# Patient Record
Sex: FEMALE | Race: WHITE | NOT HISPANIC OR LATINO | Employment: FULL TIME | URBAN - METROPOLITAN AREA
[De-identification: names, ages, dates, MRNs, and addresses within clinical notes are randomized per-mention and may not be internally consistent; named-entity substitution may affect disease eponyms.]

---

## 2023-11-03 PROBLEM — M79.672 LEFT FOOT PAIN: Status: ACTIVE | Noted: 2023-11-03

## 2023-11-03 NOTE — PROGRESS NOTES
"Office visit 11/02/2023  (S) \" I've got a hammertoe and Dr. Rodriguez sent me your way. I can pick that thing out and it just keeps coming back. I want the thing fixed.\"  (O) Patient with:   Contracted deformed left fifth toe with thick hyperkeratotic lesion at tibial nail border with palpable exostosis at distal phalanx left fifth. Curled under left fifth toe with weight bearing.  (IMP) Hammertoe left fifth, with hyperkeratotic lesion dorsomedially. Dislocated DIPJ left fifth with adductovarus fifth toe.  (PLAN)    Exam, H&P   Evaluated, managed foot problem   Debrided HD-5 left   X-rays x3 left foot   Advise of diagnosis and treatment options   Advise flexor set with exostectomy distal phalanx, left fifth   Will schedule surgery for 12/08/2023  "

## 2023-11-09 ENCOUNTER — PREP FOR PROCEDURE (OUTPATIENT)
Dept: PODIATRY | Facility: HOSPITAL | Age: 43
End: 2023-11-09
Payer: COMMERCIAL

## 2023-11-09 DIAGNOSIS — M20.42 ACQUIRED HAMMER TOE DEFORMITY OF LESSER TOE OF LEFT FOOT: Primary | ICD-10-CM

## 2023-11-09 NOTE — H&P
3eHistory Of Present Illness  Azalea Monae is a 43 y.o. female presenting with painful left fifth toe, curled under fourth toe with painful corn.     Past Medical History  She has no past medical history on file.    Surgical History  She has no past surgical history on file.     Social History  She has no history on file for tobacco use, alcohol use, and drug use.    Family History  No family history on file.     Allergies  Patient has no allergy information on record.    Review of Systems    REVIEW OF SYSTEMS  GENERAL:  Negative for malaise, significant weight loss, fever  HEENT:  No changes in hearing or vision, no nose bleeds or other nasal problems and Negative for frequent or significant headaches  NECK:  Negative for lumps, goiter, pain and significant neck swelling  RESPIRATORY:  Negative for cough, wheezing and shortness of breath  CARDIOVASCULAR:  Negative for chest pain, leg swelling and palpitations  GI:  Negative for abdominal discomfort, blood in stools or black stools and change in bowel habits  :  Negative for dysuria, frequency and incontinence  MUSCULOSKELETAL:  Negative for joint pain or swelling, back pain, and muscle pain.  SKIN:  Negative for lesions, rash, and itching  PSYCH:  Negative for sleep disturbance, mood disorder and recent psychosocial stressors  HEMATOLOGY/LYMPHOLOGY:  Negative for prolonged bleeding, bruising easily, and swollen nodes.  ENDOCRINE:  Negative for cold or heat intolerance, polyuria, polydipsia and goiter  NEURO: Negative, denies any burning, tingling or numbness     Objective:   Vasc: DP and PT pulses are palpable bilateral.  CFT is less than 3 seconds bilateral.  Skin temperature is warm to warm proximal to distal bilateral.      Neuro:  Light touch is intact to the foot bilateral.  Protective sensation is intact to the foot when tested with the 5.07 SWM bilateral.  There is no clonus noted.      Derm: Nails 1-5 bilateral are intact.  Skin is supple with normal  texture and turgor noted.  Webspaces are clean, dry and intact bilateral.  There is a painful hyperkeratotic lesion noted at the dorsal tibial nail groove of the left fifth toe.    Ortho: Muscle strength is 5/5 for all pedal groups tested.  Ankle joint, subtalar joint, 1st MPJ and lesser MPJ ROM is full and without pain or crepitus.  The foot type is rectus bilateral off weight bearing.  The structural deformities of a curled under left fifth toe with palpable exostosis at the tip of the distal phalanx of the left fifth toe is noted.        Physical Exam     Last Recorded Vitals  There were no vitals taken for this visit.    Relevant Results        Radiographs taken in office 11/02/2023 demonstrate the contracted toe with distal phalanx exostosis, left fifth toe.     Assessment/Plan       Hammertoe, left fifth  Surgical plan:  Hammertoe correction with exostectomy and flexor tendon release, left fifth on 12/08/2023       I spent 45 minutes in the professional and overall care of this patient on 11/02/2023      José Kirkland DPM

## 2023-11-29 ENCOUNTER — TELEPHONE (OUTPATIENT)
Dept: PREADMISSION TESTING | Facility: HOSPITAL | Age: 43
End: 2023-11-29
Payer: COMMERCIAL

## 2023-12-01 ENCOUNTER — PRE-ADMISSION TESTING (OUTPATIENT)
Dept: PREADMISSION TESTING | Facility: HOSPITAL | Age: 43
End: 2023-12-01
Payer: COMMERCIAL

## 2023-12-01 VITALS
RESPIRATION RATE: 16 BRPM | TEMPERATURE: 96.3 F | HEART RATE: 74 BPM | WEIGHT: 127.87 LBS | SYSTOLIC BLOOD PRESSURE: 104 MMHG | BODY MASS INDEX: 21.3 KG/M2 | HEIGHT: 65 IN | OXYGEN SATURATION: 98 % | DIASTOLIC BLOOD PRESSURE: 59 MMHG

## 2023-12-01 PROCEDURE — 99204 OFFICE O/P NEW MOD 45 MIN: CPT | Performed by: PHYSICIAN ASSISTANT

## 2023-12-01 RX ORDER — MELATONIN 10 MG
10 CAPSULE ORAL NIGHTLY
COMMUNITY
Start: 2021-07-28

## 2023-12-01 RX ORDER — GLUCOSAM/CHONDRO/HERB 149/HYAL 750-100 MG
2 TABLET ORAL NIGHTLY
COMMUNITY

## 2023-12-01 RX ORDER — DEXTROAMPHETAMINE SACCHARATE, AMPHETAMINE ASPARTATE MONOHYDRATE, DEXTROAMPHETAMINE SULFATE AND AMPHETAMINE SULFATE 5; 5; 5; 5 MG/1; MG/1; MG/1; MG/1
20 CAPSULE, EXTENDED RELEASE ORAL EVERY MORNING
COMMUNITY

## 2023-12-01 RX ORDER — SOLRIAMFETOL 150 MG/1
1 TABLET, FILM COATED ORAL DAILY
COMMUNITY

## 2023-12-01 RX ORDER — BISMUTH SUBSALICYLATE 262 MG
1 TABLET,CHEWABLE ORAL DAILY
COMMUNITY

## 2023-12-01 RX ORDER — CETIRIZINE HYDROCHLORIDE 10 MG/1
10 TABLET ORAL
COMMUNITY

## 2023-12-01 RX ORDER — OMEPRAZOLE 20 MG/1
20 CAPSULE, DELAYED RELEASE ORAL DAILY
COMMUNITY
Start: 2023-08-14

## 2023-12-01 ASSESSMENT — ENCOUNTER SYMPTOMS
ENDOCRINE NEGATIVE: 1
HEMATOLOGIC/LYMPHATIC NEGATIVE: 1
PSYCHIATRIC NEGATIVE: 1
GASTROINTESTINAL NEGATIVE: 1
ALLERGIC/IMMUNOLOGIC NEGATIVE: 1
CARDIOVASCULAR NEGATIVE: 1
CONSTITUTIONAL NEGATIVE: 1
RESPIRATORY NEGATIVE: 1
EYES NEGATIVE: 1
NEUROLOGICAL NEGATIVE: 1

## 2023-12-01 NOTE — H&P (VIEW-ONLY)
[facility] Evaluation       Name: Azalea Monae (Azalea Monae)  /Age: 1980/43 y.o.     In-Person       Chief Complaint: Acquired hammer toe deformity of lesser toe of left foot     HPI    Date of Consult: 23    Referring Provider: Dr. Kirkland    Surgery, Date, and Length: Digit Foot Joint Repair - Left ; 23; 60 minutes    Azalea Monae  is a 43 year-old female who presents to the [facility] for perioperative risk assessment prior to surgery.  States history unknown remote fracture.  Since then deformity of toe that causes friction and callous development.    This note was created in part upon personal review of patient's medical records.      Patient is scheduled to have Digit Foot Joint Repair - Left     Medical History  Past Medical History:   Diagnosis Date    Allergic rhinitis     Chronic fatigue     GERD (gastroesophageal reflux disease)     Hepatitis A     questionable diagnosis >20 years    POTS (postural orthostatic tachycardia syndrome)     + tilt table  - pt asymptomatic    Vision loss     glasses    Vocal cord paralysis     left        STOP BANG = 0    Caprini = 2    Surgical History  Past Surgical History:   Procedure Laterality Date    CERVICAL BIOPSY  W/ LOOP ELECTRODE EXCISION       SECTION, LOW TRANSVERSE      OTHER SURGICAL HISTORY      vocal cord surgery    WISDOM TOOTH EXTRACTION            The patient is not a [employer] and will accept blood and blood products if medically indicated.        Family history:  Family History   Problem Relation Name Age of Onset    Seizures Mother      Diabetes Father      Kidney disease Father          Social history:  Social History     Socioeconomic History    Marital status:      Spouse name: Not on file    Number of children: Not on file    Years of education: Not on file    Highest education level: Not on file   Occupational History    Not on file   Tobacco Use    Smoking status: Former     Packs/day: 0.50      "Years: 16.00     Additional pack years: 0.00     Total pack years: 8.00     Types: Cigarettes     Quit date: 2016     Years since quittin.9    Smokeless tobacco: Not on file   Substance and Sexual Activity    Alcohol use: Yes     Comment: rare social    Drug use: Never    Sexual activity: Not on file   Other Topics Concern    Not on file   Social History Narrative    Not on file     Social Determinants of Health     Financial Resource Strain: Not on file   Food Insecurity: Not on file   Transportation Needs: Not on file   Physical Activity: Not on file   Stress: Not on file   Social Connections: Not on file   Intimate Partner Violence: Not on file   Housing Stability: Not on file          Current Outpatient Medications:     melatonin 10 mg capsule, Take 1 capsule (10 mg) by mouth once daily at bedtime., Disp: , Rfl:     omeprazole (PriLOSEC) 20 mg DR capsule, Take 1 capsule (20 mg) by mouth once daily., Disp: , Rfl:     amphetamine-dextroamphetamine XR (Adderall XR) 20 mg 24 hr capsule, Take 1 capsule (20 mg) by mouth once daily in the morning., Disp: , Rfl:     cetirizine (ZyrTEC) 10 mg tablet, Take 1 tablet (10 mg) by mouth once daily., Disp: , Rfl:     multivitamin tablet, Take 1 tablet by mouth once daily., Disp: , Rfl:     NON FORMULARY, Take 1 each by mouth once daily. AZO, Disp: , Rfl:     omega 3-dha-epa-fish oil (Fish OiL) 1,000 mg (120 mg-180 mg) capsule, Take 2 capsules (2,000 mg) by mouth once daily at bedtime., Disp: , Rfl:     Sunosi 150 mg tablet, Take 1 tablet by mouth once daily., Disp: , Rfl:          Visit Vitals  /59   Pulse 74   Temp 35.7 °C (96.3 °F)   Resp 16   Ht 1.651 m (5' 5\")   Wt 58 kg (127 lb 13.9 oz)   SpO2 98%   BMI 21.28 kg/m²   Smoking Status Former   BSA 1.63 m²        Review of Systems   Constitutional: Negative.    HENT: Negative.     Eyes: Negative.    Respiratory: Negative.     Cardiovascular: Negative.    Gastrointestinal: Negative.    Endocrine: Negative.  "   Genitourinary: Negative.    Musculoskeletal:         Left toe pain   Skin: Negative.    Allergic/Immunologic: Negative.    Neurological: Negative.    Hematological: Negative.    Psychiatric/Behavioral: Negative.          Physical Exam  Vitals reviewed.   Constitutional:       Appearance: Normal appearance.   HENT:      Head: Normocephalic and atraumatic.      Right Ear: External ear normal.      Left Ear: External ear normal.      Nose: Nose normal.      Mouth/Throat:      Pharynx: Oropharynx is clear.   Eyes:      Extraocular Movements: Extraocular movements intact.      Conjunctiva/sclera: Conjunctivae normal.      Pupils: Pupils are equal, round, and reactive to light.   Cardiovascular:      Rate and Rhythm: Normal rate and regular rhythm.      Heart sounds: Normal heart sounds.   Pulmonary:      Effort: Pulmonary effort is normal.      Breath sounds: Normal breath sounds.   Abdominal:      Palpations: Abdomen is soft.   Musculoskeletal:         General: Normal range of motion.      Cervical back: Normal range of motion and neck supple.   Skin:     General: Skin is warm and dry.   Neurological:      General: No focal deficit present.      Mental Status: She is alert and oriented to person, place, and time.   Psychiatric:         Mood and Affect: Mood normal.         Behavior: Behavior normal.          PAT AIRWAY:   Airway:     Mallampati::  I    Neck ROM::  Full      RCRI  0  , 3.9 % Risk of MACE    Hematology       Patient instructed to ambulate as soon as possible postoperatively to decrease thromboembolic risk.      Initiate mechanical DVT prophylaxis as soon as possible and initiate chemical prophylaxis when deemed safe from a bleeding standpoint post surgery.       VTE prophylaxis per surgical team     Tests ordered in PAT: none    Risk assessment complete.  Patient is scheduled for a low surgical risk procedure.        Preoperative medication instructions were provided and reviewed with the patient.  Any  additional testing or evaluation was explained to the patient.  Nothing by mouth instructions were discussed and patient's questions were answered prior to conclusion to this encounter.  Patient verbalized understanding of preoperative instructions given in preadmission testing; discharge instructions available in EMR.    This note was dictated by a speech recognition.  Minor errors may have been detected in a speech recognition.

## 2023-12-01 NOTE — CPM/PAT H&P
[facility] Evaluation       Name: Azalea Monae (Azalea Monae)  /Age: 1980/43 y.o.     In-Person       Chief Complaint: Acquired hammer toe deformity of lesser toe of left foot     HPI    Date of Consult: 23    Referring Provider: Dr. Kirkland    Surgery, Date, and Length: Digit Foot Joint Repair - Left ; 23; 60 minutes    Azalea Monae  is a 43 year-old female who presents to the [facility] for perioperative risk assessment prior to surgery.  States history unknown remote fracture.  Since then deformity of toe that causes friction and callous development.    This note was created in part upon personal review of patient's medical records.      Patient is scheduled to have Digit Foot Joint Repair - Left     Medical History  Past Medical History:   Diagnosis Date    Allergic rhinitis     Chronic fatigue     GERD (gastroesophageal reflux disease)     Hepatitis A     questionable diagnosis >20 years    POTS (postural orthostatic tachycardia syndrome)     + tilt table  - pt asymptomatic    Vision loss     glasses    Vocal cord paralysis     left        STOP BANG = 0    Caprini = 2    Surgical History  Past Surgical History:   Procedure Laterality Date    CERVICAL BIOPSY  W/ LOOP ELECTRODE EXCISION       SECTION, LOW TRANSVERSE      OTHER SURGICAL HISTORY      vocal cord surgery    WISDOM TOOTH EXTRACTION            The patient is not a [employer] and will accept blood and blood products if medically indicated.        Family history:  Family History   Problem Relation Name Age of Onset    Seizures Mother      Diabetes Father      Kidney disease Father          Social history:  Social History     Socioeconomic History    Marital status:      Spouse name: Not on file    Number of children: Not on file    Years of education: Not on file    Highest education level: Not on file   Occupational History    Not on file   Tobacco Use    Smoking status: Former     Packs/day: 0.50      "Years: 16.00     Additional pack years: 0.00     Total pack years: 8.00     Types: Cigarettes     Quit date: 2016     Years since quittin.9    Smokeless tobacco: Not on file   Substance and Sexual Activity    Alcohol use: Yes     Comment: rare social    Drug use: Never    Sexual activity: Not on file   Other Topics Concern    Not on file   Social History Narrative    Not on file     Social Determinants of Health     Financial Resource Strain: Not on file   Food Insecurity: Not on file   Transportation Needs: Not on file   Physical Activity: Not on file   Stress: Not on file   Social Connections: Not on file   Intimate Partner Violence: Not on file   Housing Stability: Not on file          Current Outpatient Medications:     melatonin 10 mg capsule, Take 1 capsule (10 mg) by mouth once daily at bedtime., Disp: , Rfl:     omeprazole (PriLOSEC) 20 mg DR capsule, Take 1 capsule (20 mg) by mouth once daily., Disp: , Rfl:     amphetamine-dextroamphetamine XR (Adderall XR) 20 mg 24 hr capsule, Take 1 capsule (20 mg) by mouth once daily in the morning., Disp: , Rfl:     cetirizine (ZyrTEC) 10 mg tablet, Take 1 tablet (10 mg) by mouth once daily., Disp: , Rfl:     multivitamin tablet, Take 1 tablet by mouth once daily., Disp: , Rfl:     NON FORMULARY, Take 1 each by mouth once daily. AZO, Disp: , Rfl:     omega 3-dha-epa-fish oil (Fish OiL) 1,000 mg (120 mg-180 mg) capsule, Take 2 capsules (2,000 mg) by mouth once daily at bedtime., Disp: , Rfl:     Sunosi 150 mg tablet, Take 1 tablet by mouth once daily., Disp: , Rfl:          Visit Vitals  /59   Pulse 74   Temp 35.7 °C (96.3 °F)   Resp 16   Ht 1.651 m (5' 5\")   Wt 58 kg (127 lb 13.9 oz)   SpO2 98%   BMI 21.28 kg/m²   Smoking Status Former   BSA 1.63 m²        Review of Systems   Constitutional: Negative.    HENT: Negative.     Eyes: Negative.    Respiratory: Negative.     Cardiovascular: Negative.    Gastrointestinal: Negative.    Endocrine: Negative.  "   Genitourinary: Negative.    Musculoskeletal:         Left toe pain   Skin: Negative.    Allergic/Immunologic: Negative.    Neurological: Negative.    Hematological: Negative.    Psychiatric/Behavioral: Negative.          Physical Exam  Vitals reviewed.   Constitutional:       Appearance: Normal appearance.   HENT:      Head: Normocephalic and atraumatic.      Right Ear: External ear normal.      Left Ear: External ear normal.      Nose: Nose normal.      Mouth/Throat:      Pharynx: Oropharynx is clear.   Eyes:      Extraocular Movements: Extraocular movements intact.      Conjunctiva/sclera: Conjunctivae normal.      Pupils: Pupils are equal, round, and reactive to light.   Cardiovascular:      Rate and Rhythm: Normal rate and regular rhythm.      Heart sounds: Normal heart sounds.   Pulmonary:      Effort: Pulmonary effort is normal.      Breath sounds: Normal breath sounds.   Abdominal:      Palpations: Abdomen is soft.   Musculoskeletal:         General: Normal range of motion.      Cervical back: Normal range of motion and neck supple.   Skin:     General: Skin is warm and dry.   Neurological:      General: No focal deficit present.      Mental Status: She is alert and oriented to person, place, and time.   Psychiatric:         Mood and Affect: Mood normal.         Behavior: Behavior normal.          PAT AIRWAY:   Airway:     Mallampati::  I    Neck ROM::  Full      RCRI  0  , 3.9 % Risk of MACE    Hematology       Patient instructed to ambulate as soon as possible postoperatively to decrease thromboembolic risk.      Initiate mechanical DVT prophylaxis as soon as possible and initiate chemical prophylaxis when deemed safe from a bleeding standpoint post surgery.       VTE prophylaxis per surgical team     Tests ordered in PAT: none    Risk assessment complete.  Patient is scheduled for a low surgical risk procedure.        Preoperative medication instructions were provided and reviewed with the patient.  Any  additional testing or evaluation was explained to the patient.  Nothing by mouth instructions were discussed and patient's questions were answered prior to conclusion to this encounter.  Patient verbalized understanding of preoperative instructions given in preadmission testing; discharge instructions available in EMR.    This note was dictated by a speech recognition.  Minor errors may have been detected in a speech recognition.

## 2023-12-01 NOTE — PREPROCEDURE INSTRUCTIONS
Medication List            Accurate as of December 1, 2023  7:29 AM. Always use your most recent med list.                amphetamine-dextroamphetamine XR 20 mg 24 hr capsule  Commonly known as: Adderall XR  Medication Adjustments for Surgery: Continue until night before surgery     cetirizine 10 mg tablet  Commonly known as: ZyrTEC  Medication Adjustments for Surgery: Stop 1 day before surgery     Fish OiL 1,000 mg (120 mg-180 mg) capsule  Generic drug: omega 3-dha-epa-fish oil  Medication Adjustments for Surgery: Stop 7 days before surgery     melatonin 10 mg capsule  Notes to patient: Continue night prior to surgery      multivitamin tablet  Medication Adjustments for Surgery: Stop 7 days before surgery     NON FORMULARY  Medication Adjustments for Surgery: Stop 7 days before surgery     omeprazole 20 mg DR capsule  Commonly known as: PriLOSEC  Medication Adjustments for Surgery: Take morning of surgery with sip of water, no other fluids     Sunosi 150 mg tablet  Generic drug: solriamfetoL  Medication Adjustments for Surgery: Continue until night before surgery                      CONTACT SURGEON'S OFFICE IF YOU DEVELOP:  * Fever = 100.4 F   * New respiratory symptoms (e.g. cough, shortness of breath, respiratory distress, sore throat)  * Recent loss of taste or smell  *Flu like symptoms such as headache, fatigue or gastrointestinal symptoms  * You develop any open sores, shingles, burning or painful urination   AND/OR:  * You no longer wish to have the surgery.  * Any other personal circumstances change that may lead to the need to cancel or defer this surgery.  *You were admitted to any hospital within one week of your planned procedure.    SMOKING:  *Quitting smoking can make a huge difference to your health and recovery from surgery.    *If you need help with quitting, call 4-157-QUIT-NOW.    THE DAY BEFORE SURGERY:  *Do not eat any food after midnight the night before surgery.   *You are permitted to  drink clear liquids (i.e. water, black coffee, tea, clear broth, apple juice) up to 2 hours before your surgery.  DIABETICS:  Please check fasting blood sugar  upon waking up.  If fasting sugar is <80 mg/dl, please drink 100ml/3oz of apple juice no later than 2 hours prior to surgery.      SURGICAL TIME  *You will be contacted between 2 p.m. and 6 p.m. the business day before your surgery with your arrival time.  *If you haven't received a call by 6pm, call [phone].  *Scheduled surgery times may change and you will be notified if this occurs-check your personal voicemail for any updates.    ON THE MORNING OF SURGERY:  *Wear comfortable, loose fitting clothing.   *Do not use moisturizers, creams, lotions or perfume.  *All jewelry and valuables should be left at home.  *Prosthetic devices such as contact lenses, hearing aids, dentures, eyelash extensions, hairpins and body piercing must be removed before surgery.    BRING WITH YOU:  *Photo ID and insurance card  *Current list of medicines and allergies  *Pacemaker/Defibrillator/Heart stent cards  *CPAP machine and mask  *Slings/splints/crutches  *Copy of your complete Advanced Directive/DHPOA-if applicable  *Neurostimulator implant remote    PARKING AND ARRIVAL:  *Check in at the Main Entrance desk and let them know you are here for surgery.  *You will be directed to the 2nd floor surgical waiting area.    AFTER OUTPATIENT SURGERY:  *A responsible adult MUST accompany you at the time of discharge and stay with you for 24 hours after your surgery.  *You may NOT drive yourself home after surgery.  *You may use a taxi or ride sharing service ([employer], Uber) to return home ONLY if you are accompanied by a friend or family member.  *Instructions for resuming your medications will be provided by your surgeon.

## 2023-12-07 ENCOUNTER — ANESTHESIA EVENT (OUTPATIENT)
Dept: OPERATING ROOM | Facility: HOSPITAL | Age: 43
End: 2023-12-07
Payer: COMMERCIAL

## 2023-12-08 ENCOUNTER — ANESTHESIA (OUTPATIENT)
Dept: OPERATING ROOM | Facility: HOSPITAL | Age: 43
End: 2023-12-08
Payer: COMMERCIAL

## 2023-12-08 ENCOUNTER — HOSPITAL ENCOUNTER (OUTPATIENT)
Facility: HOSPITAL | Age: 43
Setting detail: OUTPATIENT SURGERY
Discharge: HOME | End: 2023-12-08
Attending: PODIATRIST | Admitting: PODIATRIST
Payer: COMMERCIAL

## 2023-12-08 VITALS
SYSTOLIC BLOOD PRESSURE: 119 MMHG | TEMPERATURE: 98.1 F | BODY MASS INDEX: 20.55 KG/M2 | HEIGHT: 66 IN | HEART RATE: 86 BPM | DIASTOLIC BLOOD PRESSURE: 43 MMHG | RESPIRATION RATE: 12 BRPM | WEIGHT: 127.87 LBS | OXYGEN SATURATION: 92 %

## 2023-12-08 DIAGNOSIS — M20.42 ACQUIRED HAMMER TOE DEFORMITY OF LESSER TOE OF LEFT FOOT: Primary | ICD-10-CM

## 2023-12-08 PROBLEM — K21.9 GASTROESOPHAGEAL REFLUX DISEASE: Status: ACTIVE | Noted: 2023-12-08

## 2023-12-08 LAB — PREGNANCY TEST URINE, POC: NEGATIVE

## 2023-12-08 PROCEDURE — 2500000005 HC RX 250 GENERAL PHARMACY W/O HCPCS: Performed by: ANESTHESIOLOGIST ASSISTANT

## 2023-12-08 PROCEDURE — 2500000004 HC RX 250 GENERAL PHARMACY W/ HCPCS (ALT 636 FOR OP/ED): Performed by: PODIATRIST

## 2023-12-08 PROCEDURE — 7100000002 HC RECOVERY ROOM TIME - EACH INCREMENTAL 1 MINUTE: Performed by: PODIATRIST

## 2023-12-08 PROCEDURE — A4217 STERILE WATER/SALINE, 500 ML: HCPCS | Performed by: PODIATRIST

## 2023-12-08 PROCEDURE — 3600000008 HC OR TIME - EACH INCREMENTAL 1 MINUTE - PROCEDURE LEVEL THREE: Performed by: PODIATRIST

## 2023-12-08 PROCEDURE — 2500000005 HC RX 250 GENERAL PHARMACY W/O HCPCS: Performed by: PODIATRIST

## 2023-12-08 PROCEDURE — 3600000003 HC OR TIME - INITIAL BASE CHARGE - PROCEDURE LEVEL THREE: Performed by: PODIATRIST

## 2023-12-08 PROCEDURE — 3700000001 HC GENERAL ANESTHESIA TIME - INITIAL BASE CHARGE: Performed by: PODIATRIST

## 2023-12-08 PROCEDURE — 7100000010 HC PHASE TWO TIME - EACH INCREMENTAL 1 MINUTE: Performed by: PODIATRIST

## 2023-12-08 PROCEDURE — A28285 PR REPAIR OF HAMMERTOE,ONE: Performed by: ANESTHESIOLOGY

## 2023-12-08 PROCEDURE — 2720000007 HC OR 272 NO HCPCS: Performed by: PODIATRIST

## 2023-12-08 PROCEDURE — 7100000001 HC RECOVERY ROOM TIME - INITIAL BASE CHARGE: Performed by: PODIATRIST

## 2023-12-08 PROCEDURE — A28285 PR REPAIR OF HAMMERTOE,ONE: Performed by: ANESTHESIOLOGIST ASSISTANT

## 2023-12-08 PROCEDURE — 3700000002 HC GENERAL ANESTHESIA TIME - EACH INCREMENTAL 1 MINUTE: Performed by: PODIATRIST

## 2023-12-08 PROCEDURE — 7100000009 HC PHASE TWO TIME - INITIAL BASE CHARGE: Performed by: PODIATRIST

## 2023-12-08 PROCEDURE — 2500000004 HC RX 250 GENERAL PHARMACY W/ HCPCS (ALT 636 FOR OP/ED): Performed by: ANESTHESIOLOGIST ASSISTANT

## 2023-12-08 DEVICE — IMPLANTABLE DEVICE: Type: IMPLANTABLE DEVICE | Site: FIFTH TOE | Status: FUNCTIONAL

## 2023-12-08 DEVICE — K-WIRE .045: Type: IMPLANTABLE DEVICE | Site: FIFTH TOE | Status: FUNCTIONAL

## 2023-12-08 RX ORDER — ALBUTEROL SULFATE 0.83 MG/ML
2.5 SOLUTION RESPIRATORY (INHALATION) ONCE AS NEEDED
Status: DISCONTINUED | OUTPATIENT
Start: 2023-12-08 | End: 2023-12-08 | Stop reason: HOSPADM

## 2023-12-08 RX ORDER — FENTANYL CITRATE 50 UG/ML
INJECTION, SOLUTION INTRAMUSCULAR; INTRAVENOUS AS NEEDED
Status: DISCONTINUED | OUTPATIENT
Start: 2023-12-08 | End: 2023-12-08

## 2023-12-08 RX ORDER — SODIUM CHLORIDE 0.9 G/100ML
IRRIGANT IRRIGATION AS NEEDED
Status: DISCONTINUED | OUTPATIENT
Start: 2023-12-08 | End: 2023-12-08 | Stop reason: HOSPADM

## 2023-12-08 RX ORDER — MIDAZOLAM HYDROCHLORIDE 1 MG/ML
INJECTION INTRAMUSCULAR; INTRAVENOUS AS NEEDED
Status: DISCONTINUED | OUTPATIENT
Start: 2023-12-08 | End: 2023-12-08

## 2023-12-08 RX ORDER — SODIUM CHLORIDE, SODIUM LACTATE, POTASSIUM CHLORIDE, CALCIUM CHLORIDE 600; 310; 30; 20 MG/100ML; MG/100ML; MG/100ML; MG/100ML
INJECTION, SOLUTION INTRAVENOUS CONTINUOUS PRN
Status: DISCONTINUED | OUTPATIENT
Start: 2023-12-08 | End: 2023-12-08

## 2023-12-08 RX ORDER — PROPOFOL 10 MG/ML
INJECTION, EMULSION INTRAVENOUS CONTINUOUS PRN
Status: DISCONTINUED | OUTPATIENT
Start: 2023-12-08 | End: 2023-12-08

## 2023-12-08 RX ORDER — DEXAMETHASONE SODIUM PHOSPHATE 4 MG/ML
INJECTION, SOLUTION INTRA-ARTICULAR; INTRALESIONAL; INTRAMUSCULAR; INTRAVENOUS; SOFT TISSUE AS NEEDED
Status: DISCONTINUED | OUTPATIENT
Start: 2023-12-08 | End: 2023-12-08

## 2023-12-08 RX ORDER — ACETAMINOPHEN 500 MG
1000 TABLET ORAL EVERY 6 HOURS PRN
Qty: 30 TABLET | Refills: 0 | Status: SHIPPED | OUTPATIENT
Start: 2023-12-08

## 2023-12-08 RX ORDER — OXYCODONE HYDROCHLORIDE 5 MG/1
5 TABLET ORAL EVERY 4 HOURS PRN
Status: DISCONTINUED | OUTPATIENT
Start: 2023-12-08 | End: 2023-12-08 | Stop reason: HOSPADM

## 2023-12-08 RX ORDER — BUPIVACAINE HYDROCHLORIDE 5 MG/ML
INJECTION, SOLUTION PERINEURAL AS NEEDED
Status: DISCONTINUED | OUTPATIENT
Start: 2023-12-08 | End: 2023-12-08 | Stop reason: HOSPADM

## 2023-12-08 RX ORDER — ONDANSETRON HYDROCHLORIDE 2 MG/ML
4 INJECTION, SOLUTION INTRAVENOUS ONCE AS NEEDED
Status: DISCONTINUED | OUTPATIENT
Start: 2023-12-08 | End: 2023-12-08 | Stop reason: HOSPADM

## 2023-12-08 RX ORDER — DIPHENHYDRAMINE HYDROCHLORIDE 50 MG/ML
12.5 INJECTION INTRAMUSCULAR; INTRAVENOUS ONCE AS NEEDED
Status: DISCONTINUED | OUTPATIENT
Start: 2023-12-08 | End: 2023-12-08 | Stop reason: HOSPADM

## 2023-12-08 RX ORDER — ACETAMINOPHEN 325 MG/1
975 TABLET ORAL ONCE
Status: COMPLETED | OUTPATIENT
Start: 2023-12-08 | End: 2023-12-08

## 2023-12-08 RX ORDER — ONDANSETRON HYDROCHLORIDE 2 MG/ML
INJECTION, SOLUTION INTRAVENOUS AS NEEDED
Status: DISCONTINUED | OUTPATIENT
Start: 2023-12-08 | End: 2023-12-08

## 2023-12-08 RX ORDER — SODIUM CHLORIDE, SODIUM LACTATE, POTASSIUM CHLORIDE, CALCIUM CHLORIDE 600; 310; 30; 20 MG/100ML; MG/100ML; MG/100ML; MG/100ML
100 INJECTION, SOLUTION INTRAVENOUS CONTINUOUS
Status: DISCONTINUED | OUTPATIENT
Start: 2023-12-08 | End: 2023-12-08 | Stop reason: HOSPADM

## 2023-12-08 RX ORDER — KETOROLAC TROMETHAMINE 30 MG/ML
INJECTION, SOLUTION INTRAMUSCULAR; INTRAVENOUS AS NEEDED
Status: DISCONTINUED | OUTPATIENT
Start: 2023-12-08 | End: 2023-12-08

## 2023-12-08 RX ORDER — LIDOCAINE HYDROCHLORIDE 20 MG/ML
INJECTION, SOLUTION EPIDURAL; INFILTRATION; INTRACAUDAL; PERINEURAL AS NEEDED
Status: DISCONTINUED | OUTPATIENT
Start: 2023-12-08 | End: 2023-12-08

## 2023-12-08 RX ORDER — PROPOFOL 10 MG/ML
INJECTION, EMULSION INTRAVENOUS AS NEEDED
Status: DISCONTINUED | OUTPATIENT
Start: 2023-12-08 | End: 2023-12-08

## 2023-12-08 RX ORDER — LIDOCAINE HYDROCHLORIDE AND EPINEPHRINE 10; 10 MG/ML; UG/ML
INJECTION, SOLUTION INFILTRATION; PERINEURAL AS NEEDED
Status: DISCONTINUED | OUTPATIENT
Start: 2023-12-08 | End: 2023-12-08 | Stop reason: HOSPADM

## 2023-12-08 RX ORDER — HYDRALAZINE HYDROCHLORIDE 20 MG/ML
5 INJECTION INTRAMUSCULAR; INTRAVENOUS EVERY 30 MIN PRN
Status: DISCONTINUED | OUTPATIENT
Start: 2023-12-08 | End: 2023-12-08 | Stop reason: HOSPADM

## 2023-12-08 RX ORDER — PHENYLEPHRINE HCL IN 0.9% NACL 1 MG/10 ML
SYRINGE (ML) INTRAVENOUS AS NEEDED
Status: DISCONTINUED | OUTPATIENT
Start: 2023-12-08 | End: 2023-12-08

## 2023-12-08 RX ADMIN — Medication 100 MCG: at 09:26

## 2023-12-08 RX ADMIN — DEXAMETHASONE SODIUM PHOSPHATE 4 MG: 4 INJECTION, SOLUTION INTRAMUSCULAR; INTRAVENOUS at 08:53

## 2023-12-08 RX ADMIN — Medication 100 MCG: at 09:06

## 2023-12-08 RX ADMIN — ACETAMINOPHEN 975 MG: 325 TABLET ORAL at 07:15

## 2023-12-08 RX ADMIN — KETOROLAC TROMETHAMINE 15 MG: 30 INJECTION, SOLUTION INTRAMUSCULAR at 09:30

## 2023-12-08 RX ADMIN — PROPOFOL 125 MCG/KG/MIN: 10 INJECTION, EMULSION INTRAVENOUS at 08:50

## 2023-12-08 RX ADMIN — FENTANYL CITRATE 25 MCG: 50 INJECTION, SOLUTION INTRAMUSCULAR; INTRAVENOUS at 08:54

## 2023-12-08 RX ADMIN — MIDAZOLAM HYDROCHLORIDE 2 MG: 1 INJECTION, SOLUTION INTRAMUSCULAR; INTRAVENOUS at 08:45

## 2023-12-08 RX ADMIN — Medication 100 MCG: at 09:18

## 2023-12-08 RX ADMIN — Medication 100 MCG: at 09:12

## 2023-12-08 RX ADMIN — SODIUM CHLORIDE, SODIUM LACTATE, POTASSIUM CHLORIDE, CALCIUM CHLORIDE: 600; 310; 30; 20 INJECTION, SOLUTION INTRAVENOUS at 08:44

## 2023-12-08 RX ADMIN — ONDANSETRON 4 MG: 2 INJECTION INTRAMUSCULAR; INTRAVENOUS at 09:22

## 2023-12-08 RX ADMIN — FENTANYL CITRATE 25 MCG: 50 INJECTION, SOLUTION INTRAMUSCULAR; INTRAVENOUS at 08:50

## 2023-12-08 RX ADMIN — LIDOCAINE HYDROCHLORIDE 80 MG: 20 INJECTION, SOLUTION EPIDURAL; INFILTRATION; INTRACAUDAL; PERINEURAL at 08:50

## 2023-12-08 ASSESSMENT — PAIN SCALES - GENERAL
PAINLEVEL_OUTOF10: 0 - NO PAIN

## 2023-12-08 ASSESSMENT — PAIN - FUNCTIONAL ASSESSMENT
PAIN_FUNCTIONAL_ASSESSMENT: 0-10

## 2023-12-08 ASSESSMENT — COLUMBIA-SUICIDE SEVERITY RATING SCALE - C-SSRS
6. HAVE YOU EVER DONE ANYTHING, STARTED TO DO ANYTHING, OR PREPARED TO DO ANYTHING TO END YOUR LIFE?: NO
2. HAVE YOU ACTUALLY HAD ANY THOUGHTS OF KILLING YOURSELF?: NO
1. IN THE PAST MONTH, HAVE YOU WISHED YOU WERE DEAD OR WISHED YOU COULD GO TO SLEEP AND NOT WAKE UP?: NO

## 2023-12-08 NOTE — ANESTHESIA POSTPROCEDURE EVALUATION
Patient: Azalea Monae    Procedure Summary       Date: 12/08/23 Room / Location: [facility] / [facility]    Anesthesia Start: 0844 Anesthesia Stop: 0941    Procedure: Foot Digit Joint Repair (Left: Fifth Toe) Diagnosis:       Acquired hammer toe deformity of lesser toe of left foot      (Acquired hammer toe deformity of lesser toe of left foot [M20.42])    Surgeons: José Kirkland DPM Responsible Provider: Ravindra Campbell MD    Anesthesia Type: MAC ASA Status: 2            Anesthesia Type: MAC    Vitals Value Taken Time   BP 89/52 12/08/23 0945   Temp 36.7 °C (98.1 °F) 12/08/23 0934   Pulse 56 12/08/23 0954   Resp 11 12/08/23 0945   SpO2 99 % 12/08/23 0954   Vitals shown include unvalidated device data.    Anesthesia Post Evaluation    Patient location during evaluation: PACU  Patient participation: complete - patient participated  Level of consciousness: awake  Pain management: adequate  Multimodal analgesia pain management approach  Airway patency: patent  Cardiovascular status: acceptable  Respiratory status: acceptable  Hydration status: acceptable  Postoperative Nausea and Vomiting: none  Comments: Will continue to assess PONV status.        There were no known notable events for this encounter.

## 2023-12-08 NOTE — PERIOPERATIVE NURSING NOTE
Patient to PACU [place] [facility] post left foot digit joint repair, patient is sedated, on SFM, IV #20 in right hand is infusing LR@100mL/hr without issue, wound to left lower extremity is clean, dry and intact. BP is low at 90/45 but patient was low in pre-op at 97/57. VSS.    0945: VSS    1000: VSS    1015: VSSDr. Kirkland at bedside    1030: VSS    1115: Patient qualifies for Phase 2

## 2023-12-08 NOTE — ANESTHESIA PREPROCEDURE EVALUATION
Patient: Azalea Monae    Procedure Information       Date/Time: 12/08/23 0830    Procedure: Foot Digit Joint Repair (Left)    Location: [facility] OR 09 / Virtual [facility] OR    Surgeons: José Kirkland DPM            Relevant Problems   Anesthesia (within normal limits)      GI   (+) Gastroesophageal reflux disease (Controlled by meds  )      Other  Vocal cord paralysis. Left-sided. No etiology       Clinical information reviewed:    Allergies  Meds   Med Hx  Surg Hx  OB Status  Fam Hx  Soc Hx        NPO Detail:  NPO/Void Status  Carbonhydrate Drink Given Prior to Surgery? : N  Date of Last Liquid: 12/08/23  Time of Last Liquid: 0425  Date of Last Solid: 12/07/23  Time of Last Solid: 1700  Last Intake Type: Clear fluids (water)  Time of Last Void: 0645         Physical Exam    Airway  Mallampati: I  TM distance: >3 FB  Neck ROM: full     Cardiovascular    Dental    Pulmonary    Abdominal            Anesthesia Plan    ASA 2     MAC     intravenous induction   Anesthetic plan and risks discussed with patient and spouse.    Plan discussed with CAA.

## 2023-12-08 NOTE — INTERVAL H&P NOTE
H&P reviewed. The patient was examined and there are no changes to the H&P. Patient to undergo Procedure(s):  Foot Digit Joint Repair

## 2023-12-08 NOTE — BRIEF OP NOTE
Date: 2023  OR Location: [facility] OR    Name: Azalea Monae : 1980, Age: 43 y.o., MRN: [id number], Sex: female    Diagnosis  Pre-op Diagnosis     * Acquired hammer toe deformity of lesser toe of left foot [M20.42] Post-op Diagnosis     * Acquired hammer toe deformity of lesser toe of left foot [M20.42]     Procedures  Foot Digit Joint Repair  45637 - WI CORRECTION COCK-UP 5TH TOE W/PLASTIC CLOSURE      Surgeons      * José Kirkland - Primary    Resident/Fellow/Other Assistant:  Surgeon(s) and Role:  Shahab Junior DPM PGY3    Procedure Summary  Anesthesia: Monitor Anesthesia Care  ASA: II  Anesthesia Staff: Anesthesiologist: Ravindra Campbell MD  C-AA: ANDREW Krishnan  Estimated Blood Loss: less than 3 mL  Intra-op Medications:   Medication Name Total Dose   BUPivacaine HCl (Marcaine) 0.5 % (5 mg/mL) injection 5 mL   lidocaine-epinephrine (Xylocaine W/EPI) 1 %-1:100,000 injection 5 mL   sodium chloride 0.9 % irrigation solution 1,000 mL              Anesthesia Record               Intraprocedure I/O Totals          Intake    Propofol Drip 0.00 mL    The total shown is the total volume documented since Anesthesia Start was filed.    Total Intake 0 mL       Output    Est. Blood Loss 1 mL    Total Output 1 mL       Net    Net Volume -1 mL          Specimen: No specimens collected     Staff:   Circulator: Jocy Viera, SYDNEE; Maye Quezada RN  Relief Circulator: Shannan Whitehead RN  Scrub Person: Yumi Martinez          Findings: Consistent with clinical and radiographic findings    Complications:  None; patient tolerated the procedure well.     Disposition: PACU - hemodynamically stable.  Condition: stable  Specimens Collected: No specimens collected  Attending Attestation:     José Kirkland  Phone Number: [phone]

## 2023-12-09 NOTE — OP NOTE
Foot Digit Joint Repair (L) Operative Note     Date: 2023  OR Location: [facility] OR    Name: Azalea Monae, : 1980, Age: 43 y.o., MRN: [id number], Sex: female    Diagnosis  Pre-op Diagnosis     * Acquired hammer toe deformity of lesser toe of left foot [M20.42] Post-op Diagnosis     * Acquired hammer toe deformity of lesser toe of left foot [M20.42]     Procedures  Foot Digit Joint Repair  80768 - ME CORRECTION COCK-UP 5TH TOE W/PLASTIC CLOSURE      Surgeons      * José Kirkland - Primary    Resident/Fellow/Other Assistant:  Surgeon(s) and Role:  Shahab Junior DPM PGY3    Procedure Summary  Anesthesia: Monitor Anesthesia Care  ASA: II  Anesthesia Staff: Anesthesiologist: Ravindra Campbell MD  C-AA: ANDREW Krishnan  Estimated Blood Loss: less than 5mL  Intra-op Medications:   Medication Name Total Dose   BUPivacaine HCl (Marcaine) 0.5 % (5 mg/mL) injection 5 mL   lidocaine-epinephrine (Xylocaine W/EPI) 1 %-1:100,000 injection 5 mL   sodium chloride 0.9 % irrigation solution 1,000 mL              Anesthesia Record               Intraprocedure I/O Totals          Intake    Propofol Drip 0.00 mL    The total shown is the total volume documented since Anesthesia Start was filed.    Total Intake 0 mL       Output    Est. Blood Loss 1 mL    Total Output 1 mL       Net    Net Volume -1 mL          Specimen: No specimens collected     Staff:   Circulator: Jocy Viera RN; Maye Quezada RN  Relief Circulator: Shannan Whitehead RN  Scrub Person: Yumi Martinez         Drains and/or Catheters: * None in log *    Tourniquet Times:         Implants:  Implants              Findings: Consistent with clinical and radiographic findings    Indications: Azalea Monae is an 43 y.o. female who is having surgery for Acquired hammer toe deformity of lesser toe of left foot [M20.42].    The patient was seen in the preoperative area. The risks, benefits, complications, treatment options, non-operative  alternatives, expected recovery and outcomes were discussed with the patient. The possibilities of reaction to medication, pulmonary aspiration, injury to surrounding structures, bleeding, recurrent infection, the need for additional procedures, failure to diagnose a condition, and creating a complication requiring transfusion or operation were discussed with the patient. The patient concurred with the proposed plan, giving informed consent.  The site of surgery was properly noted/marked if necessary per policy. The patient has been actively warmed in preoperative area. Preoperative antibiotics are not indicated. Venous thrombosis prophylaxis have been ordered including unilateral sequential compression device    Procedure Details: The patient was brought to the operating room and placed on the operating table in the supine position.  Following IV sedation local anesthesia was obtained utilizing 10cc of a 1:1 mixture of 1% lidocaine with 1:100,000 Epinephrine and 0.5% marcaine plain.  The foot was then scrubbed, prepped, and draped in the usual aseptic manner.       Attention was directed to the 5th digit where a 2 converging 2 cm semielliptical longitudinal incision was made running dorsodistomedial to proximolateral at the distal interphalangeal joint of the digit.  The incision was then deepened through the subcutaneous tissues with care to retract all neurovascular structures.  The ellipse was removed with sharp and blunt disection.  All bleeders were cauterized and ligated.     A transverse tenotomy and capsulotomy was performed to the distal interphalangeal joint of the 5th digit. The head of the middle phalanx was then freed of its soft tissue attachments.  An oscillating bone saw was then used to resect the head of the middle phalanx and was then passed from the operating room table.  The phalanx was then smoothed of its rough edges with a bone rasp. Using a freer the soft tissue attachments were reflected  form the distal phalanx of the 5th digit and a bone rasp was used to smooth an exostosis growth medially.     The wound was then flushed with copious amounts of sterile saline and the extensor tendon and subcutaneous tissue was reapproximated with 4-0 vicryl.  Skin was reapproximated with 4-0 prolene..     The incision was dressed with Betadine soaked adaptic and covered with sterile compressive dressing including four by fours kerlix and ACE wrap.     The patient tolerated the procedure well and was transferred to the recovery room with all vital signs stable and vascular status intact.  Following postoperative monitoring the patient will be discharged and given instructions and prescriptions which were discussed prior to the surgery.    Complications:  None; patient tolerated the procedure well.    Disposition: PACU - hemodynamically stable.  Condition: stable     Attending Attestation:     José Kirkland  Phone Number: [phone]

## (undated) DEVICE — GLOVE, SURGICAL, PROTEXIS PI W/NEU-THERA, 8.0, PF, LF

## (undated) DEVICE — DRESSING, GAUZE, SPONGE, VERSALON, 4 PLY, 2 X 2 IN, STERILE

## (undated) DEVICE — GLOVE, SURGICAL, PROTEXIS PI MICRO, 7.5, PF, LF

## (undated) DEVICE — BANDAGE, STRETCH, CONFORM, 2 IN X 4.1 YD, STERILE

## (undated) DEVICE — BLADE, SAW, OSCILLATING/SAGITTAL, 5.5 X 25 X 0.38 MM, STERILE

## (undated) DEVICE — DRESSING, GAUZE, FLUFF, 1 PLY, 18 X 36 IN

## (undated) DEVICE — Device

## (undated) DEVICE — NEEDLE, HYPODERMIC, 25 G X 1.5 IN, A BEVEL, STERILE

## (undated) DEVICE — SUTURE, VICRYL, 3-0, 27IN, RB-1

## (undated) DEVICE — SUTURE, VICRYL, 4-0, 18 IN, PS2, UNDYED

## (undated) DEVICE — DRESSING, NON-ADHERENT, 3 X 3 IN, STERILE

## (undated) DEVICE — BANDAGE, ELASTIC, PREMIUM, SELF-CLOSE, 3 IN X 5 YD, STERILE